# Patient Record
Sex: MALE | Race: WHITE | Employment: STUDENT | URBAN - METROPOLITAN AREA
[De-identification: names, ages, dates, MRNs, and addresses within clinical notes are randomized per-mention and may not be internally consistent; named-entity substitution may affect disease eponyms.]

---
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
## 2023-01-10 LAB
ABSOLUTE BASOPHILS: 39 CELLS/UL (ref 0–200)
ABSOLUTE EOSINOPHILS: 112 CELLS/UL (ref 15–500)
ABSOLUTE LYMPHOCYTES: 2559 CELLS/UL (ref 850–3900)
ABSOLUTE MONOCYTES: 515 CELLS/UL (ref 200–950)
ABSOLUTE NEUTROPHILS: 2374 CELLS/UL (ref 1500–7800)
ALBUMIN/GLOBULIN RATIO: 2.2 (CALC) (ref 1–2.5)
ALBUMIN: 4.6 G/DL (ref 3.6–5.1)
ALKALINE PHOSPHATASE: 78 U/L (ref 46–169)
ALT: 16 U/L (ref 8–46)
AST: 14 U/L (ref 12–32)
BASOPHILS: 0.7 %
BILIRUBIN, TOTAL: 0.9 MG/DL (ref 0.2–1.1)
BUN: 18 MG/DL (ref 7–20)
CALCIUM: 9.6 MG/DL (ref 8.9–10.4)
CARBON DIOXIDE: 28 MMOL/L (ref 20–32)
CHLAMYDIA TRACHOMATIS$RNA, TMA: NOT DETECTED
CHLORIDE: 103 MMOL/L (ref 98–110)
CHOL/HDLC RATIO: 3 (CALC)
CHOLESTEROL, TOTAL: 164 MG/DL
CREATININE: 1.02 MG/DL (ref 0.6–1.24)
EGFR: 109 ML/MIN/1.73M2
EOSINOPHILS: 2 %
GLOBULIN: 2.1 G/DL (CALC) (ref 2.1–3.5)
GLUCOSE: 89 MG/DL (ref 65–99)
HDL CHOLESTEROL: 55 MG/DL
HEMATOCRIT: 44.9 % (ref 38.5–50)
HEMOGLOBIN: 15.7 G/DL (ref 13.2–17.1)
LDL-CHOLESTEROL: 92 MG/DL (CALC)
LYMPHOCYTES: 45.7 %
MCH: 31.7 PG (ref 27–33)
MCHC: 35 G/DL (ref 32–36)
MCV: 90.7 FL (ref 80–100)
MONOCYTES: 9.2 %
MPV: 9.7 FL (ref 7.5–12.5)
NEISSERIA GONORRHOEAE$RNA, TMA: NOT DETECTED
NEUTROPHILS: 42.4 %
NON-HDL CHOLESTEROL: 109 MG/DL (CALC)
PLATELET COUNT: 246 THOUSAND/UL (ref 140–400)
POTASSIUM: 4.1 MMOL/L (ref 3.8–5.1)
PROTEIN, TOTAL: 6.7 G/DL (ref 6.3–8.2)
RDW: 12.5 % (ref 11–15)
RED BLOOD CELL COUNT: 4.95 MILLION/UL (ref 4.2–5.8)
SODIUM: 138 MMOL/L (ref 135–146)
TRIGLYCERIDES: 77 MG/DL
TSH W/REFLEX TO FT4: 1.73 MIU/L (ref 0.5–4.3)
WHITE BLOOD CELL COUNT: 5.6 THOUSAND/UL (ref 3.8–10.8)

## 2023-01-12 ENCOUNTER — PATIENT MESSAGE (OUTPATIENT)
Dept: FAMILY MEDICINE CLINIC | Facility: CLINIC | Age: 18
End: 2023-01-12

## 2023-01-12 NOTE — TELEPHONE ENCOUNTER
From: Eleazar Lam  To: Carla Garcia DO  Sent: 1/12/2023 1:55 PM CST  Subject: Question regarding TESTOSTERONE,FREE AND TOTAL    Are my levels of Testosterone, Free normal? Especially with my T3 uptake percentage borderline high? Would any of these cause any concerns? Just curious.      Thank you

## 2023-11-22 ENCOUNTER — PATIENT MESSAGE (OUTPATIENT)
Dept: FAMILY MEDICINE CLINIC | Facility: CLINIC | Age: 18
End: 2023-11-22

## 2023-11-28 NOTE — TELEPHONE ENCOUNTER
From: Aissatou Pennington  To: Kathleen Sadler  Sent: 11/22/2023 6:38 PM CST  Subject: Recent Blood Test Results    Hello Doctor, I am sending my blood work results from [facility] that were taken two weeks ago. These tests were submitted and [facility] 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 If you could take a look at my results, and let me know what to do further. Also, could you put in a blood work order for my vitamin D levels, as it has been denied by my insurance when the dermatologist requested the order.      Thank you,  Aissatou Pennington

## (undated) NOTE — LETTER
[facility] IN [place]  [address] [address]  Dept: [phone]  Dept Fax: [phone]         October 21, 2017    Patient: Ellen Brooks   YOB: 2005   Date of Visit: 10/21/2017       To Whom It May Concern:

## (undated) NOTE — LETTER
[facility] [employer] of Child Health Examination       Student's Name  Carlyle Moss Birth Date Signature                                                                                                                                   Title                           Date     Signature Grade Level/ID#  12th Grade   HEALTH HISTORY          TO BE COMPLETED AND SIGNED BY PARENT/GUARDIAN AND VERIFIED BY HEALTH CARE PROVIDER    ALLERGIES  (Food, drug, insect, other)  Patient has no known allergies.  MEDICATION  (List all prescribed or taken o PHYSICAL EXAMINATION REQUIREMENTS    Entire section below to be completed by MD//APN/PA       PHYSICAL EXAMINATION REQUIREMENTS (head circumference if <33 years old):   /60 (BP Location: Right arm, Patient Position: Sitting, Cuff Size: adult)   Pu Eyes Yes     Screen result:   Genito-Urinary Yes  LMP   Nose Yes  Neurological Yes    Throat Yes  Musculoskeletal Yes    Mouth/Dental Yes  Spinal examination Yes    Cardiovascular/HTN Yes  Nutritional status Yes    Respiratory Yes                   Diagnos Rev 11/15                                                                    Printed by the [employer]